# Patient Record
Sex: MALE | Race: WHITE | NOT HISPANIC OR LATINO | Employment: OTHER | URBAN - METROPOLITAN AREA
[De-identification: names, ages, dates, MRNs, and addresses within clinical notes are randomized per-mention and may not be internally consistent; named-entity substitution may affect disease eponyms.]

---

## 2019-03-14 PROBLEM — Z86.73 H/O: CVA (CEREBROVASCULAR ACCIDENT): Status: ACTIVE | Noted: 2019-03-14

## 2019-03-14 PROBLEM — I65.29 CAROTID ARTERY STENOSIS: Status: RESOLVED | Noted: 2019-03-14 | Resolved: 2019-03-14

## 2019-03-14 PROBLEM — Z87.898 HISTORY OF UNSTEADY GAIT: Status: ACTIVE | Noted: 2019-03-14

## 2019-03-14 PROBLEM — I10 ESSENTIAL HYPERTENSION: Status: ACTIVE | Noted: 2019-03-14

## 2019-03-14 PROBLEM — I65.29 CAROTID ARTERY STENOSIS: Status: ACTIVE | Noted: 2019-03-14

## 2019-03-14 PROBLEM — I65.23 CAROTID STENOSIS, BILATERAL: Status: ACTIVE | Noted: 2019-03-14

## 2019-03-14 PROBLEM — E11.49 TYPE 2 DIABETES MELLITUS WITH NEUROLOGIC COMPLICATION: Status: ACTIVE | Noted: 2019-03-14

## 2019-03-14 PROBLEM — H53.8 VISUAL BLURRINESS: Status: ACTIVE | Noted: 2019-03-14

## 2019-03-15 ENCOUNTER — TELEPHONE (OUTPATIENT)
Dept: NEUROSURGERY | Facility: CLINIC | Age: 74
End: 2019-03-15

## 2019-03-15 ENCOUNTER — TELEPHONE (OUTPATIENT)
Dept: NEUROLOGY | Facility: CLINIC | Age: 74
End: 2019-03-15

## 2019-03-15 PROBLEM — G20.C PRIMARY PARKINSONISM: Status: ACTIVE | Noted: 2019-03-14

## 2019-03-15 NOTE — TELEPHONE ENCOUNTER
"----- Message from Pamela Matta MD sent at 3/15/2019 12:51 PM CDT -----  Please ignore previous message about follow up with me. Looks like the patient had a positive response to Sinemet trial in the hospital, "confirming" parksinons disease. So please schedule pt with Dr Summers. I'll put in a referral to her   "

## 2019-03-15 NOTE — TELEPHONE ENCOUNTER
----- Message from Aimee Flores NP sent at 3/15/2019  9:11 AM CDT -----  He needs to f/u with Dr Spear in 3-4 weeks to discuss left CEA. He is admitted now but should be discharged in near future.

## 2019-04-02 PROBLEM — I49.5 SICK SINUS SYNDROME: Status: ACTIVE | Noted: 2019-04-02

## 2019-04-02 PROBLEM — Z79.4 TYPE 2 DIABETES MELLITUS WITH DIABETIC POLYNEUROPATHY, WITH LONG-TERM CURRENT USE OF INSULIN: Status: ACTIVE | Noted: 2019-03-14

## 2019-04-02 PROBLEM — E11.42 TYPE 2 DIABETES MELLITUS WITH DIABETIC POLYNEUROPATHY, WITH LONG-TERM CURRENT USE OF INSULIN: Status: ACTIVE | Noted: 2019-03-14

## 2019-04-02 PROBLEM — I65.22 STENOSIS OF LEFT CAROTID ARTERY: Status: ACTIVE | Noted: 2019-03-14

## 2019-04-02 PROBLEM — Z95.0 PRESENCE OF PERMANENT CARDIAC PACEMAKER: Status: ACTIVE | Noted: 2019-04-02

## 2019-04-02 PROBLEM — I65.21 OCCLUSION OF RIGHT CAROTID ARTERY: Status: ACTIVE | Noted: 2019-04-02

## 2019-05-21 PROBLEM — I25.10 CORONARY ARTERY DISEASE DUE TO CALCIFIED CORONARY LESION: Status: ACTIVE | Noted: 2019-05-21

## 2019-05-21 PROBLEM — I25.84 CORONARY ARTERY DISEASE DUE TO CALCIFIED CORONARY LESION: Status: ACTIVE | Noted: 2019-05-21
